# Patient Record
Sex: FEMALE | Race: WHITE | Employment: OTHER | URBAN - NONMETROPOLITAN AREA
[De-identification: names, ages, dates, MRNs, and addresses within clinical notes are randomized per-mention and may not be internally consistent; named-entity substitution may affect disease eponyms.]

---

## 2017-04-24 LAB
CHOLESTEROL, TOTAL: 158 MG/DL
CHOLESTEROL/HDL RATIO: 3.3
HDLC SERPL-MCNC: NORMAL MG/DL (ref 35–70)
LDL CHOLESTEROL CALCULATED: 90.8 MG/DL (ref 0–160)
TRIGL SERPL-MCNC: 96 MG/DL
VLDLC SERPL CALC-MCNC: 19 MG/DL

## 2017-04-25 LAB
BASOPHILS ABSOLUTE: 0.16 /ΜL
BASOPHILS RELATIVE PERCENT: 2.05 %
EOSINOPHILS ABSOLUTE: 0.26 /ΜL
EOSINOPHILS RELATIVE PERCENT: 3.36 %
HCT VFR BLD CALC: 38.7 % (ref 36–46)
HEMOGLOBIN: 12.3 G/DL (ref 12–16)
LYMPHOCYTES ABSOLUTE: 1.59 /ΜL
LYMPHOCYTES RELATIVE PERCENT: 20.7 %
MCH RBC QN AUTO: 33 PG
MCHC RBC AUTO-ENTMCNC: 31.9 G/DL
MCV RBC AUTO: 103.5 FL
MONOCYTES ABSOLUTE: 0.86 /ΜL
MONOCYTES RELATIVE PERCENT: 11.2 %
NEUTROPHILS ABSOLUTE: 4.8 /ΜL
NEUTROPHILS RELATIVE PERCENT: 62.6 %
PDW BLD-RTO: 12 %
PLATELET # BLD: 329 K/ΜL
PMV BLD AUTO: 6.7 FL
RBC # BLD: 3.74 10^6/ΜL
WBC # BLD: 7.66 10^3/ML

## 2017-05-01 ENCOUNTER — OFFICE VISIT (OUTPATIENT)
Dept: FAMILY MEDICINE CLINIC | Age: 82
End: 2017-05-01
Payer: MEDICARE

## 2017-05-01 ENCOUNTER — TELEPHONE (OUTPATIENT)
Dept: FAMILY MEDICINE CLINIC | Age: 82
End: 2017-05-01

## 2017-05-01 VITALS
SYSTOLIC BLOOD PRESSURE: 138 MMHG | HEIGHT: 62 IN | WEIGHT: 204 LBS | BODY MASS INDEX: 37.54 KG/M2 | HEART RATE: 88 BPM | DIASTOLIC BLOOD PRESSURE: 74 MMHG

## 2017-05-01 DIAGNOSIS — N30.00 ACUTE CYSTITIS WITHOUT HEMATURIA: Primary | ICD-10-CM

## 2017-05-01 DIAGNOSIS — I25.9 ISCHEMIA OF HEART, CHRONIC: ICD-10-CM

## 2017-05-01 DIAGNOSIS — R10.32 LEFT LOWER QUADRANT PAIN: ICD-10-CM

## 2017-05-01 PROCEDURE — 99214 OFFICE O/P EST MOD 30 MIN: CPT | Performed by: FAMILY MEDICINE

## 2017-05-01 RX ORDER — GABAPENTIN 100 MG/1
300 CAPSULE ORAL 4 TIMES DAILY
COMMUNITY

## 2017-05-01 RX ORDER — FENTANYL 25 UG/H
PATCH TRANSDERMAL
COMMUNITY
Start: 2016-06-27 | End: 2017-09-06 | Stop reason: ALTCHOICE

## 2017-05-01 ASSESSMENT — ENCOUNTER SYMPTOMS
VOMITING: 0
SHORTNESS OF BREATH: 1
COUGH: 0
CHEST TIGHTNESS: 0
NAUSEA: 1
WHEEZING: 0

## 2017-05-13 ENCOUNTER — OFFICE VISIT (OUTPATIENT)
Dept: FAMILY MEDICINE CLINIC | Age: 82
End: 2017-05-13
Payer: MEDICARE

## 2017-05-13 VITALS
SYSTOLIC BLOOD PRESSURE: 130 MMHG | DIASTOLIC BLOOD PRESSURE: 70 MMHG | BODY MASS INDEX: 37.54 KG/M2 | WEIGHT: 204 LBS | HEIGHT: 62 IN | HEART RATE: 68 BPM

## 2017-05-13 DIAGNOSIS — M79.89 PAIN AND SWELLING OF LEFT LOWER LEG: ICD-10-CM

## 2017-05-13 DIAGNOSIS — R60.0 EDEMA OF LEFT LOWER EXTREMITY: Primary | ICD-10-CM

## 2017-05-13 DIAGNOSIS — L03.116 CELLULITIS OF LEFT LOWER EXTREMITY: ICD-10-CM

## 2017-05-13 DIAGNOSIS — M79.662 PAIN AND SWELLING OF LEFT LOWER LEG: ICD-10-CM

## 2017-05-13 PROCEDURE — 4040F PNEUMOC VAC/ADMIN/RCVD: CPT | Performed by: FAMILY MEDICINE

## 2017-05-13 PROCEDURE — 1090F PRES/ABSN URINE INCON ASSESS: CPT | Performed by: FAMILY MEDICINE

## 2017-05-13 PROCEDURE — G8427 DOCREV CUR MEDS BY ELIG CLIN: HCPCS | Performed by: FAMILY MEDICINE

## 2017-05-13 PROCEDURE — 1123F ACP DISCUSS/DSCN MKR DOCD: CPT | Performed by: FAMILY MEDICINE

## 2017-05-13 PROCEDURE — 99214 OFFICE O/P EST MOD 30 MIN: CPT | Performed by: FAMILY MEDICINE

## 2017-05-13 PROCEDURE — G8417 CALC BMI ABV UP PARAM F/U: HCPCS | Performed by: FAMILY MEDICINE

## 2017-05-13 PROCEDURE — 1036F TOBACCO NON-USER: CPT | Performed by: FAMILY MEDICINE

## 2017-05-13 PROCEDURE — G8599 NO ASA/ANTIPLAT THER USE RNG: HCPCS | Performed by: FAMILY MEDICINE

## 2017-05-13 RX ORDER — AZITHROMYCIN 250 MG/1
TABLET, FILM COATED ORAL
Qty: 1 PACKET | Refills: 0 | Status: SHIPPED | OUTPATIENT
Start: 2017-05-13 | End: 2017-05-23 | Stop reason: ALTCHOICE

## 2017-05-13 ASSESSMENT — ENCOUNTER SYMPTOMS
CHEST TIGHTNESS: 0
WHEEZING: 0

## 2017-05-15 DIAGNOSIS — M79.662 PAIN AND SWELLING OF LEFT LOWER LEG: ICD-10-CM

## 2017-05-15 DIAGNOSIS — R60.0 EDEMA OF LEFT LOWER EXTREMITY: ICD-10-CM

## 2017-05-15 DIAGNOSIS — M79.89 PAIN AND SWELLING OF LEFT LOWER LEG: ICD-10-CM

## 2017-05-23 ENCOUNTER — OFFICE VISIT (OUTPATIENT)
Dept: FAMILY MEDICINE CLINIC | Age: 82
End: 2017-05-23
Payer: MEDICARE

## 2017-05-23 VITALS
WEIGHT: 192 LBS | BODY MASS INDEX: 35.12 KG/M2 | SYSTOLIC BLOOD PRESSURE: 142 MMHG | DIASTOLIC BLOOD PRESSURE: 78 MMHG | HEART RATE: 80 BPM

## 2017-05-23 DIAGNOSIS — R60.0 EDEMA OF LEFT LOWER EXTREMITY: Primary | ICD-10-CM

## 2017-05-23 PROCEDURE — 1036F TOBACCO NON-USER: CPT | Performed by: FAMILY MEDICINE

## 2017-05-23 PROCEDURE — 1123F ACP DISCUSS/DSCN MKR DOCD: CPT | Performed by: FAMILY MEDICINE

## 2017-05-23 PROCEDURE — 1090F PRES/ABSN URINE INCON ASSESS: CPT | Performed by: FAMILY MEDICINE

## 2017-05-23 PROCEDURE — G8599 NO ASA/ANTIPLAT THER USE RNG: HCPCS | Performed by: FAMILY MEDICINE

## 2017-05-23 PROCEDURE — 99213 OFFICE O/P EST LOW 20 MIN: CPT | Performed by: FAMILY MEDICINE

## 2017-05-23 PROCEDURE — 4040F PNEUMOC VAC/ADMIN/RCVD: CPT | Performed by: FAMILY MEDICINE

## 2017-05-23 PROCEDURE — G8417 CALC BMI ABV UP PARAM F/U: HCPCS | Performed by: FAMILY MEDICINE

## 2017-05-23 PROCEDURE — G8427 DOCREV CUR MEDS BY ELIG CLIN: HCPCS | Performed by: FAMILY MEDICINE

## 2017-05-23 ASSESSMENT — ENCOUNTER SYMPTOMS
WHEEZING: 0
SHORTNESS OF BREATH: 0
COUGH: 0

## 2017-06-05 ENCOUNTER — TELEPHONE (OUTPATIENT)
Dept: FAMILY MEDICINE CLINIC | Age: 82
End: 2017-06-05

## 2017-06-08 ENCOUNTER — OFFICE VISIT (OUTPATIENT)
Dept: FAMILY MEDICINE CLINIC | Age: 82
End: 2017-06-08
Payer: MEDICARE

## 2017-06-08 VITALS
HEART RATE: 88 BPM | BODY MASS INDEX: 36.58 KG/M2 | SYSTOLIC BLOOD PRESSURE: 128 MMHG | WEIGHT: 200 LBS | DIASTOLIC BLOOD PRESSURE: 72 MMHG

## 2017-06-08 DIAGNOSIS — K59.03 THERAPEUTIC OPIOID-INDUCED CONSTIPATION (OIC): Primary | ICD-10-CM

## 2017-06-08 DIAGNOSIS — R60.0 EDEMA OF LEFT LOWER EXTREMITY: ICD-10-CM

## 2017-06-08 DIAGNOSIS — T40.2X5A THERAPEUTIC OPIOID-INDUCED CONSTIPATION (OIC): Primary | ICD-10-CM

## 2017-06-08 PROCEDURE — 99495 TRANSJ CARE MGMT MOD F2F 14D: CPT | Performed by: FAMILY MEDICINE

## 2017-06-08 ASSESSMENT — ENCOUNTER SYMPTOMS
DIARRHEA: 0
CONSTIPATION: 0
NAUSEA: 0
VOMITING: 0
SHORTNESS OF BREATH: 1

## 2017-06-16 RX ORDER — DOXYCYCLINE HYCLATE 100 MG
100 TABLET ORAL 2 TIMES DAILY
Qty: 20 TABLET | Refills: 0 | Status: SHIPPED | OUTPATIENT
Start: 2017-06-16 | End: 2017-06-26

## 2017-06-29 ENCOUNTER — TELEPHONE (OUTPATIENT)
Dept: FAMILY MEDICINE CLINIC | Age: 82
End: 2017-06-29

## 2017-07-20 ENCOUNTER — TELEPHONE (OUTPATIENT)
Dept: FAMILY MEDICINE CLINIC | Age: 82
End: 2017-07-20

## 2017-07-24 ENCOUNTER — TELEPHONE (OUTPATIENT)
Dept: FAMILY MEDICINE CLINIC | Age: 82
End: 2017-07-24

## 2017-09-06 ENCOUNTER — OFFICE VISIT (OUTPATIENT)
Dept: FAMILY MEDICINE CLINIC | Age: 82
End: 2017-09-06
Payer: MEDICARE

## 2017-09-06 VITALS — HEART RATE: 100 BPM | SYSTOLIC BLOOD PRESSURE: 132 MMHG | DIASTOLIC BLOOD PRESSURE: 74 MMHG

## 2017-09-06 DIAGNOSIS — K43.9 VENTRAL HERNIA WITHOUT OBSTRUCTION OR GANGRENE: ICD-10-CM

## 2017-09-06 DIAGNOSIS — K59.03 THERAPEUTIC OPIOID-INDUCED CONSTIPATION (OIC): ICD-10-CM

## 2017-09-06 DIAGNOSIS — G89.4 CHRONIC PAIN DISORDER: ICD-10-CM

## 2017-09-06 DIAGNOSIS — T40.2X5A THERAPEUTIC OPIOID-INDUCED CONSTIPATION (OIC): ICD-10-CM

## 2017-09-06 DIAGNOSIS — K59.03 DRUG-INDUCED CONSTIPATION: Primary | ICD-10-CM

## 2017-09-06 PROCEDURE — G8599 NO ASA/ANTIPLAT THER USE RNG: HCPCS | Performed by: FAMILY MEDICINE

## 2017-09-06 PROCEDURE — 1123F ACP DISCUSS/DSCN MKR DOCD: CPT | Performed by: FAMILY MEDICINE

## 2017-09-06 PROCEDURE — G8417 CALC BMI ABV UP PARAM F/U: HCPCS | Performed by: FAMILY MEDICINE

## 2017-09-06 PROCEDURE — 1090F PRES/ABSN URINE INCON ASSESS: CPT | Performed by: FAMILY MEDICINE

## 2017-09-06 PROCEDURE — 99214 OFFICE O/P EST MOD 30 MIN: CPT | Performed by: FAMILY MEDICINE

## 2017-09-06 PROCEDURE — G8427 DOCREV CUR MEDS BY ELIG CLIN: HCPCS | Performed by: FAMILY MEDICINE

## 2017-09-06 PROCEDURE — 1036F TOBACCO NON-USER: CPT | Performed by: FAMILY MEDICINE

## 2017-09-06 PROCEDURE — 4040F PNEUMOC VAC/ADMIN/RCVD: CPT | Performed by: FAMILY MEDICINE

## 2017-09-06 RX ORDER — POLYETHYLENE GLYCOL 3350 17 G/17G
17 POWDER, FOR SOLUTION ORAL DAILY PRN
COMMUNITY
End: 2017-09-06 | Stop reason: SDUPTHER

## 2017-09-06 RX ORDER — POLYETHYLENE GLYCOL 3350 17 G/17G
17 POWDER, FOR SOLUTION ORAL 2 TIMES DAILY PRN
Qty: 1 BOTTLE | Refills: 5 | Status: SHIPPED | OUTPATIENT
Start: 2017-09-06

## 2017-09-06 RX ORDER — FENTANYL 50 UG/H
1 PATCH TRANSDERMAL
COMMUNITY

## 2017-09-06 ASSESSMENT — ENCOUNTER SYMPTOMS
BACK PAIN: 1
CONSTIPATION: 1

## 2017-09-18 ENCOUNTER — TELEPHONE (OUTPATIENT)
Dept: FAMILY MEDICINE CLINIC | Age: 82
End: 2017-09-18

## 2017-09-25 ENCOUNTER — OFFICE VISIT (OUTPATIENT)
Dept: FAMILY MEDICINE CLINIC | Age: 82
End: 2017-09-25
Payer: MEDICARE

## 2017-09-25 VITALS — SYSTOLIC BLOOD PRESSURE: 110 MMHG | DIASTOLIC BLOOD PRESSURE: 60 MMHG | HEART RATE: 60 BPM

## 2017-09-25 DIAGNOSIS — T40.2X5A THERAPEUTIC OPIOID-INDUCED CONSTIPATION (OIC): ICD-10-CM

## 2017-09-25 DIAGNOSIS — N30.00 ACUTE CYSTITIS WITHOUT HEMATURIA: ICD-10-CM

## 2017-09-25 DIAGNOSIS — G89.4 CHRONIC PAIN DISORDER: Primary | ICD-10-CM

## 2017-09-25 DIAGNOSIS — R31.9 HEMATURIA: ICD-10-CM

## 2017-09-25 DIAGNOSIS — L03.115 CELLULITIS OF RIGHT LOWER EXTREMITY: ICD-10-CM

## 2017-09-25 DIAGNOSIS — K59.03 THERAPEUTIC OPIOID-INDUCED CONSTIPATION (OIC): ICD-10-CM

## 2017-09-25 PROCEDURE — 99495 TRANSJ CARE MGMT MOD F2F 14D: CPT | Performed by: FAMILY MEDICINE

## 2017-09-25 RX ORDER — METOPROLOL SUCCINATE 50 MG/1
50 TABLET, EXTENDED RELEASE ORAL DAILY
COMMUNITY

## 2017-09-25 RX ORDER — CLOPIDOGREL BISULFATE 75 MG/1
75 TABLET ORAL DAILY
Qty: 30 TABLET | Refills: 5 | Status: SHIPPED | OUTPATIENT
Start: 2017-09-25

## 2017-09-25 RX ORDER — BACLOFEN 10 MG/1
10 TABLET ORAL 3 TIMES DAILY
COMMUNITY

## 2017-09-25 RX ORDER — DOXYCYCLINE HYCLATE 100 MG
100 TABLET ORAL 2 TIMES DAILY
Qty: 20 TABLET | Refills: 0 | Status: SHIPPED | OUTPATIENT
Start: 2017-09-25 | End: 2017-10-05

## 2017-09-25 RX ORDER — CLOPIDOGREL BISULFATE 75 MG/1
75 TABLET ORAL DAILY
COMMUNITY
End: 2017-09-25 | Stop reason: SDUPTHER

## 2017-09-25 ASSESSMENT — ENCOUNTER SYMPTOMS: CONSTIPATION: 1

## 2017-09-26 LAB
APPEARANCE: ABNORMAL
BACTERIA: ABNORMAL 1HPF
BILIRUBIN: ABNORMAL
BLOOD: ABNORMAL
CASTS: ABNORMAL /LPF
COLOR: YELLOW
CRYSTALS: ABNORMAL /HPF
EPITHELIAL CELLS, UA: ABNORMAL /HPF
GLUCOSE: ABNORMAL MG/DL
KETONES: ABNORMAL MG/DL
LEUKOCYTES, UA: ABNORMAL
MICROSCOPIC URINE: ABNORMAL
MUCUS: ABNORMAL /HPF
NITRITE, URINE: ABNORMAL
PH: 6.5 PH
PROTEIN,SCREEN: ABNORMAL MG/DL
RBC: ABNORMAL /HPF
SPECIFIC GRAVITY, URINE: 1.01 MG/DL
URINE CULTURE, ROUTINE: NORMAL
UROBILINOGEN, URINE: 0.2 MG/DL
WBC URINE: ABNORMAL
YEAST: ABNORMAL /HPF

## 2017-10-03 DIAGNOSIS — R31.9 URINARY TRACT INFECTION WITH HEMATURIA, SITE UNSPECIFIED: Primary | ICD-10-CM

## 2017-10-03 DIAGNOSIS — N39.0 URINARY TRACT INFECTION WITH HEMATURIA, SITE UNSPECIFIED: Primary | ICD-10-CM

## 2017-10-06 ENCOUNTER — TELEPHONE (OUTPATIENT)
Dept: FAMILY MEDICINE CLINIC | Age: 82
End: 2017-10-06

## 2017-10-06 LAB — URINE CULTURE, ROUTINE: NORMAL

## 2017-10-09 DIAGNOSIS — R31.9 URINARY TRACT INFECTION WITH HEMATURIA, SITE UNSPECIFIED: Primary | ICD-10-CM

## 2017-10-09 DIAGNOSIS — N39.0 URINARY TRACT INFECTION WITH HEMATURIA, SITE UNSPECIFIED: ICD-10-CM

## 2017-10-09 DIAGNOSIS — R31.9 URINARY TRACT INFECTION WITH HEMATURIA, SITE UNSPECIFIED: ICD-10-CM

## 2017-10-09 DIAGNOSIS — N39.0 URINARY TRACT INFECTION WITH HEMATURIA, SITE UNSPECIFIED: Primary | ICD-10-CM

## 2017-10-09 RX ORDER — SULFAMETHOXAZOLE AND TRIMETHOPRIM 800; 160 MG/1; MG/1
1 TABLET ORAL 2 TIMES DAILY
Qty: 28 TABLET | Refills: 0 | Status: SHIPPED | OUTPATIENT
Start: 2017-10-09 | End: 2017-10-23

## 2017-10-10 ENCOUNTER — TELEPHONE (OUTPATIENT)
Dept: FAMILY MEDICINE CLINIC | Age: 82
End: 2017-10-10

## 2017-10-10 NOTE — TELEPHONE ENCOUNTER
Not eating or drinking over the weekend, skin tugor is not good. They are aware she is to start a new ATB for the urine test, she did pass a clot in her urine over the weekend. She has been in bed all weekend, did get up yesterday for a short time. No BM since Sunday.      Concerned about fluid intake

## 2017-10-10 NOTE — TELEPHONE ENCOUNTER
S/w Cecliia Barajas at [facility] she is doing worse, has had watery diarrhea.   Advised to send her to ER for evaluation

## 2017-10-12 LAB
APPEARANCE: CLEAR
BACTERIA: ABNORMAL 1HPF
BILIRUBIN: ABNORMAL
BLOOD: ABNORMAL
CASTS: ABNORMAL /LPF
COLOR: YELLOW
CRYSTALS: ABNORMAL /HPF
EPITHELIAL CELLS, UA: ABNORMAL /HPF
GLUCOSE: ABNORMAL MG/DL
KETONES: ABNORMAL MG/DL
LEUKOCYTES, UA: ABNORMAL
MICROSCOPIC URINE: ABNORMAL
MUCUS: ABNORMAL /HPF
NITRITE, URINE: ABNORMAL
PH: 6 PH
PROTEIN,SCREEN: ABNORMAL MG/DL
RBC: ABNORMAL /HPF
SPECIFIC GRAVITY, URINE: 1.02 MG/DL
UROBILINOGEN, URINE: 0.2 MG/DL
WBC URINE: ABNORMAL
YEAST: ABNORMAL /HPF

## 2017-10-13 LAB — URINE CULTURE, ROUTINE: NORMAL

## 2017-10-28 ENCOUNTER — TELEPHONE (OUTPATIENT)
Dept: FAMILY MEDICINE CLINIC | Age: 82
End: 2017-10-28

## 2017-10-28 DIAGNOSIS — R41.82 ALTERED MENTAL STATUS, UNSPECIFIED ALTERED MENTAL STATUS TYPE: Primary | ICD-10-CM

## 2017-11-15 ENCOUNTER — TELEPHONE (OUTPATIENT)
Dept: FAMILY MEDICINE CLINIC | Age: 82
End: 2017-11-15

## 2017-11-15 NOTE — TELEPHONE ENCOUNTER
Gerardo Yen at [facility] called and requested last visit note, faxed on 11/15/17

## 2017-11-15 NOTE — TELEPHONE ENCOUNTER
Noted, thanks. Last visit in September and in [address] [facility] since October.